# Patient Record
Sex: FEMALE | Race: WHITE | URBAN - METROPOLITAN AREA
[De-identification: names, ages, dates, MRNs, and addresses within clinical notes are randomized per-mention and may not be internally consistent; named-entity substitution may affect disease eponyms.]

---

## 2017-01-18 ENCOUNTER — TELEPHONE (OUTPATIENT)
Dept: FAMILY MEDICINE | Facility: OTHER | Age: 32
End: 2017-01-18

## 2017-01-18 DIAGNOSIS — Z30.49 ENCOUNTER FOR SURVEILLANCE OF OTHER CONTRACEPTIVES: Primary | ICD-10-CM

## 2017-01-18 NOTE — TELEPHONE ENCOUNTER
NuvaRing      Last Written Prescription Date: 1/22/2016  Last Fill Quantity: 1each,  # refills: 12 ordered   Last Office Visit with G, P or [facility] prescribing provider: 1/22/2016    Rema Healy MA

## 2017-01-19 RX ORDER — ETONOGESTREL/ETHINYL ESTRADIOL .12-.015MG
RING, VAGINAL VAGINAL
Qty: 1 EACH | Refills: 0 | Status: SHIPPED | OUTPATIENT
Start: 2017-01-19 | End: 2017-02-23

## 2017-01-19 NOTE — TELEPHONE ENCOUNTER
Medication is being filled for 1 time refill only due to:  Patient needs to be seen because it has been more than one year since last visit.     Mone Palacios, RN, BSN

## 2017-02-21 NOTE — PROGRESS NOTES
SUBJECTIVE:     CC: Tana Figueroa is an 31 year old woman who presents for preventive health visit.     Physical   Annual:     Getting at least 3 servings of Calcium per day::  Yes    Bi-annual eye exam::  NO    Dental care twice a year::  Yes    Sleep apnea or symptoms of sleep apnea::  None    Diet::  Regular (no restrictions)    Frequency of exercise::  2-3 days/week    Duration of exercise::  30-45 minutes    Taking medications regularly::  Yes    Medication side effects::  None    Additional concerns today::  No        -------------------------------------    Today's PHQ-2 Score:   PHQ-2 ( 1999 [employer]) 2/20/2017   Q1: Little interest or pleasure in doing things -   Q2: Feeling down, depressed or hopeless -   PHQ-2 Score -   Little interest or pleasure in doing things Not at all   Feeling down, depressed or hopeless Not at all   PHQ-2 Score 0       Abuse: Current or Past(Physical, Sexual or Emotional)- No  Do you feel safe in your environment - Yes    Social History   Substance Use Topics     Smoking status: Never Smoker     Smokeless tobacco: Never Used     Alcohol use Yes      Comment: occ     The patient does not drink >3 drinks per day nor >7 drinks per week.    No results for input(s): CHOL, HDL, LDL, TRIG, CHOLHDLRATIO, NHDL in the last 30213 hours.    Reviewed orders with patient.  Reviewed health maintenance and updated orders accordingly - Yes    Mammo Decision Support:  Mammogram not appropriate for this patient based on age.    Pertinent mammograms are reviewed under the imaging tab.  History of abnormal Pap smear: NO - age 30- 65 PAP every 3 years recommended  All Histories reviewed and updated in Epic.    Past Medical History   Diagnosis Date     Migraine       History reviewed. No pertinent past surgical history.    ROS:  C: NEGATIVE for fever, chills, change in weight  I: NEGATIVE for worrisome rashes, moles or lesions  E: NEGATIVE for vision changes or irritation  ENT: NEGATIVE for ear, mouth  and throat problems  R: NEGATIVE for significant cough or SOB  B: NEGATIVE for masses, tenderness or discharge  CV: NEGATIVE for chest pain, palpitations or peripheral edema  GI: NEGATIVE for nausea, abdominal pain, heartburn, or change in bowel habits  : NEGATIVE for unusual urinary or vaginal symptoms. Periods are regular.  M: NEGATIVE for significant arthralgias or myalgia  N: NEGATIVE for weakness, dizziness or paresthesias  P: NEGATIVE for changes in mood or affect    Problem list, Medication list, Allergies, and Medical/Social/Surgical histories reviewed in [facility] and updated as appropriate.  Labs reviewed in EPIC  BP Readings from Last 3 Encounters:   02/23/17 (!) 142/104   01/22/16 128/80   11/04/15 (!) 154/107    Wt Readings from Last 3 Encounters:   No data found for Wt                  Patient Active Problem List   Diagnosis     Migraine without aura     Fatigue     Screening for lipoid disorders     Contraception     Elevated blood pressure reading without diagnosis of hypertension     Anxiety     History reviewed. No pertinent past surgical history.    Social History   Substance Use Topics     Smoking status: Never Smoker     Smokeless tobacco: Never Used     Alcohol use Yes      Comment: occ     Family History   Problem Relation Age of Onset     Hypertension Mother      DIABETES Mother      DIABETES Father      C.A.D. Maternal Grandmother          Current Outpatient Prescriptions   Medication Sig Dispense Refill     hydrOXYzine (ATARAX) 25 MG tablet Take 1-2 tablets (25-50 mg) by mouth every 6 hours as needed for anxiety 60 tablet 1     etonogestrel-ethinyl estradiol (NUVARING) 0.12-0.015 MG/24HR vaginal ring Place 1 each vaginally every 30 days 1 each 11     [DISCONTINUED] NUVARING 0.12-0.015 MG/24HR vaginal ring INSERT 1 VAGINAL RING BY VAGINAL ROUTE ONCE A MONTH LEAVE IN PLACE FOR 3 WEEKS, REMOVE FOR 1 WEEK 1 each 0     No Known Allergies  OBJECTIVE:     BP (!) 142/104  Pulse 113  Temp 98.2  F (36.8   C) (Temporal)  Resp 12  SpO2 96%   BP (!) 142/104  Pulse 113  Temp 98.2  F (36.8  C) (Temporal)  Resp 12  SpO2 96%    EXAM:  Physical Exam   Constitutional: She is oriented to person, place, and time. She appears well-developed and well-nourished.   HENT:   Head: Normocephalic and atraumatic.   Right Ear: External ear normal.   Left Ear: External ear normal.   Mouth/Throat: Oropharynx is clear and moist.   Eyes: EOM are normal.   Neck: Neck supple.   Cardiovascular: Normal rate and regular rhythm.    Pulmonary/Chest: Effort normal and breath sounds normal.   Abdominal: Soft. Bowel sounds are normal.   Musculoskeletal: Normal range of motion.   Neurological: She is alert and oriented to person, place, and time.   Psychiatric:   Anxious          ASSESSMENT/PLAN:       Problem List Items Addressed This Visit        SPECIALTY PROBLEM LIST    Contraception     On nuvaring   No side effects  Refills provided            Other    Migraine without aura     She does not wish to be on medications.  counselled         Elevated blood pressure reading without diagnosis of hypertension     Due to anxiety   Will monitor         Anxiety     Declines treatment for anxiety   Trial hydroxyzine for anxiety attacks  She seems to be obese and would benefit from a sleep study but she declined this today          Relevant Medications    hydrOXYzine (ATARAX) 25 MG tablet    Other Relevant Orders    TSH with free T4 reflex (Completed)    Comprehensive metabolic panel (BMP + Alb, Alk Phos, ALT, AST, Total. Bili, TP) (Completed)    CBC with platelets (Completed)      Other Visit Diagnoses     Encounter for routine adult health examination with abnormal findings    -  Primary    Need for prophylactic vaccination and inoculation against influenza        Encounter for surveillance of other contraceptives        Relevant Medications    etonogestrel-ethinyl estradiol (NUVARING) 0.12-0.015 MG/24HR vaginal ring    Other Relevant Orders    Lipid panel  reflex to direct LDL (Completed)        Needs letter for tinted glasses due to her migraine  Declined to be weighed today   Is extremly anxious which could be the reason for elevated blood pressure    COUNSELING:  Reviewed preventive health counseling, as reflected in patient instructions       Regular exercise       Healthy diet/nutrition       Vision screening       Hearing screening    BP Screening:   Last 3 BP Readings:    BP Readings from Last 3 Encounters:   02/23/17 (!) 142/104   01/22/16 128/80   11/04/15 (!) 154/107       The following was recommended to the patient:  Recommend lifestyle modifications     reports that she has never smoked. She has never used smokeless tobacco.    There is no height or weight on file to calculate BMI.   Weight management plan: Unknown weight as pt refuses to be weighed    Counseling Resources:  ATP IV Guidelines  Pooled Cohorts Equation Calculator  Breast Cancer Risk Calculator  FRAX Risk Assessment  ICSI Preventive Guidelines  Dietary Guidelines for Americans, 2010  USDA's MyPlate  ASA Prophylaxis  Lung CA Screening    Crystal Pelletier MD  [facility]  Answers for HPI/ROS submitted by the patient on 2/20/2017   PHQ-2 Depressed: Not at all, Not at all  PHQ-2 Score: 0

## 2017-02-23 ENCOUNTER — OFFICE VISIT (OUTPATIENT)
Dept: FAMILY MEDICINE | Facility: OTHER | Age: 32
End: 2017-02-23
Payer: COMMERCIAL

## 2017-02-23 VITALS
OXYGEN SATURATION: 96 % | TEMPERATURE: 98.2 F | SYSTOLIC BLOOD PRESSURE: 142 MMHG | RESPIRATION RATE: 12 BRPM | DIASTOLIC BLOOD PRESSURE: 104 MMHG | HEART RATE: 113 BPM

## 2017-02-23 DIAGNOSIS — Z30.9 ENCOUNTER FOR CONTRACEPTIVE MANAGEMENT, UNSPECIFIED CONTRACEPTIVE ENCOUNTER TYPE: ICD-10-CM

## 2017-02-23 DIAGNOSIS — R73.9 HYPERGLYCEMIA: Primary | ICD-10-CM

## 2017-02-23 DIAGNOSIS — Z30.49 ENCOUNTER FOR SURVEILLANCE OF OTHER CONTRACEPTIVES: ICD-10-CM

## 2017-02-23 DIAGNOSIS — F41.9 ANXIETY: ICD-10-CM

## 2017-02-23 DIAGNOSIS — G43.009 MIGRAINE WITHOUT AURA AND WITHOUT STATUS MIGRAINOSUS, NOT INTRACTABLE: ICD-10-CM

## 2017-02-23 DIAGNOSIS — Z23 NEED FOR PROPHYLACTIC VACCINATION AND INOCULATION AGAINST INFLUENZA: ICD-10-CM

## 2017-02-23 DIAGNOSIS — R03.0 ELEVATED BLOOD PRESSURE READING WITHOUT DIAGNOSIS OF HYPERTENSION: ICD-10-CM

## 2017-02-23 DIAGNOSIS — Z00.01 ENCOUNTER FOR ROUTINE ADULT HEALTH EXAMINATION WITH ABNORMAL FINDINGS: Primary | ICD-10-CM

## 2017-02-23 LAB
ALBUMIN SERPL-MCNC: 3.6 G/DL (ref 3.4–5)
ALP SERPL-CCNC: 47 U/L (ref 40–150)
ALT SERPL W P-5'-P-CCNC: 19 U/L (ref 0–50)
ANION GAP SERPL CALCULATED.3IONS-SCNC: 10 MMOL/L (ref 3–14)
AST SERPL W P-5'-P-CCNC: 13 U/L (ref 0–45)
BILIRUB SERPL-MCNC: 0.3 MG/DL (ref 0.2–1.3)
BUN SERPL-MCNC: 12 MG/DL (ref 7–30)
CALCIUM SERPL-MCNC: 8.7 MG/DL (ref 8.5–10.1)
CHLORIDE SERPL-SCNC: 106 MMOL/L (ref 94–109)
CHOLEST SERPL-MCNC: 170 MG/DL
CO2 SERPL-SCNC: 26 MMOL/L (ref 20–32)
CREAT SERPL-MCNC: 0.58 MG/DL (ref 0.52–1.04)
ERYTHROCYTE [DISTWIDTH] IN BLOOD BY AUTOMATED COUNT: 14.9 % (ref 10–15)
GFR SERPL CREATININE-BSD FRML MDRD: ABNORMAL ML/MIN/1.7M2
GLUCOSE SERPL-MCNC: 140 MG/DL (ref 70–99)
HBA1C MFR BLD: 6 % (ref 4.3–6)
HCT VFR BLD AUTO: 40.2 % (ref 35–47)
HDLC SERPL-MCNC: 40 MG/DL
HGB BLD-MCNC: 13.2 G/DL (ref 11.7–15.7)
LDLC SERPL CALC-MCNC: 104 MG/DL
MCH RBC QN AUTO: 28.1 PG (ref 26.5–33)
MCHC RBC AUTO-ENTMCNC: 32.8 G/DL (ref 31.5–36.5)
MCV RBC AUTO: 86 FL (ref 78–100)
NONHDLC SERPL-MCNC: 130 MG/DL
PLATELET # BLD AUTO: 316 10E9/L (ref 150–450)
POTASSIUM SERPL-SCNC: 3.7 MMOL/L (ref 3.4–5.3)
PROT SERPL-MCNC: 7.8 G/DL (ref 6.8–8.8)
RBC # BLD AUTO: 4.7 10E12/L (ref 3.8–5.2)
SODIUM SERPL-SCNC: 142 MMOL/L (ref 133–144)
TRIGL SERPL-MCNC: 129 MG/DL
TSH SERPL DL<=0.005 MIU/L-ACNC: 1.44 MU/L (ref 0.4–4)
WBC # BLD AUTO: 8.7 10E9/L (ref 4–11)

## 2017-02-23 PROCEDURE — 83036 HEMOGLOBIN GLYCOSYLATED A1C: CPT | Performed by: FAMILY MEDICINE

## 2017-02-23 PROCEDURE — 36415 COLL VENOUS BLD VENIPUNCTURE: CPT | Performed by: FAMILY MEDICINE

## 2017-02-23 PROCEDURE — 85027 COMPLETE CBC AUTOMATED: CPT | Performed by: FAMILY MEDICINE

## 2017-02-23 PROCEDURE — 99395 PREV VISIT EST AGE 18-39: CPT | Performed by: FAMILY MEDICINE

## 2017-02-23 PROCEDURE — 84443 ASSAY THYROID STIM HORMONE: CPT | Performed by: FAMILY MEDICINE

## 2017-02-23 PROCEDURE — 80053 COMPREHEN METABOLIC PANEL: CPT | Performed by: FAMILY MEDICINE

## 2017-02-23 PROCEDURE — 80061 LIPID PANEL: CPT | Performed by: FAMILY MEDICINE

## 2017-02-23 RX ORDER — HYDROXYZINE HYDROCHLORIDE 25 MG/1
25-50 TABLET, FILM COATED ORAL EVERY 6 HOURS PRN
Qty: 60 TABLET | Refills: 1 | Status: SHIPPED | OUTPATIENT
Start: 2017-02-23 | End: 2017-10-25

## 2017-02-23 RX ORDER — ETONOGESTREL AND ETHINYL ESTRADIOL VAGINAL RING .015; .12 MG/D; MG/D
1 RING VAGINAL
Qty: 1 EACH | Refills: 11 | Status: SHIPPED | OUTPATIENT
Start: 2017-02-23 | End: 2018-01-25

## 2017-02-23 ASSESSMENT — PAIN SCALES - GENERAL: PAINLEVEL: SEVERE PAIN (6)

## 2017-02-23 NOTE — LETTER
[address]  [facility] [address]  [phone]  Dept: [phone]      2/23/2017    Re: Tana Figueroa      TO WHOM IT MAY CONCERN:    This Letter is to authorize patient's tinted windows as a medical necessity for her diagnosis of Migraine headache.      The minimum percentage of light transmittance may be reduced to 15% in order to help reduce the frequency of migraine headaches.     This letter of medical necessity is valid for 1 year from the date of this letter at which point she needs to be reevaluated for her headaches     Cordially     Crystal Pelletier MD  [facility]

## 2017-02-23 NOTE — ASSESSMENT & PLAN NOTE
Declines treatment for anxiety   Trial hydroxyzine for anxiety attacks  She seems to be obese and would benefit from a sleep study but she declined this today

## 2017-02-23 NOTE — NURSING NOTE
Chief Complaint   Patient presents with     Physical     Panel Management     height, april, flu, map       Initial BP (!) 170/96  Pulse 113  Temp 98.2  F (36.8  C) (Temporal)  Resp 12  SpO2 96% There is no height or weight on file to calculate BMI.  Medication Reconciliation: complete  Velma Yuan CMA (AAMA)

## 2017-02-23 NOTE — LETTER
My Migraine Action Plan      Date: 2/23/2017     My Name: Tana Figueroa   YOB: 1985  My Pharmacy: [facility] - [place], [place] - [address] [facility] [facility] [place]       My (Preventative) Control Medicine:None        My Rescue Medicine: None   My Doctor: No primary care provider on file.     My Clinic: [facility]  [address]  [facility] [address]  [phone]        GREEN ZONE = Good Control    My headache plan is working.   I can do what I need to do.           I WILL:     ? Keep managing my triggers.  ? Write in my migraine diary each time I have a headache.  ? Keep taking my preventive (controller) medicine daily.  ? Take my relief and rescue medicine as needed.             YELLOW ZONE = Not Enough Control    My headache plan isn t always working.   My headaches keep me from doing   some of the things I need to do.       I WILL:     ? Set goals to control my triggers and act on them.  ? Write in my migraine diary each time I have a headache and review it for                      patterns or new triggers.  ? Keep taking my preventive (controller) medicine daily.  ? Take my relief and rescue medicine as needed.  ? Call my doctor or clinic at if I stay in the Yellow Zone.             RED ZONE = Poor or No Control    My headache plan has  failed. I can t do anything  when I have one. My  medicines aren t working.           I WILL:   ? Set goals to control my triggers and act on them.  ? Write in my migraine diary each time I have a headache and review it for                      patterns or new triggers.  ? Keep taking my preventive (controller) medicine daily.  ? Take my relief and rescue medicine as needed.  ? Call my doctor or clinic or go to urgent care or an ER if I m having the worst                  headache of my life.  ? Call my doctor or clinic or go to urgent care or an ER if my medicine doesn t work.  ? Let my doctor or clinic know within 2 weeks if I have gone to  an urgent care or             emergency department.          Provider specific instructions:

## 2017-02-23 NOTE — MR AVS SNAPSHOT
After Visit Summary   2/23/2017    Tana Figueroa    MRN: [id number]           Patient Information     Date Of Birth          1985        Visit Information        Provider Department      2/23/2017 11:00 AM Crystal Pelletier MD [facility]        Today's Diagnoses     Need for prophylactic vaccination and inoculation against influenza    -  1    Anxiety        Encounter for surveillance of other contraceptives           Follow-ups after your visit        Follow-up notes from your care team     Return in about 1 year (around 2/23/2018) for Physical Exam.      Future tests that were ordered for you today     Open Future Orders        Priority Expected Expires Ordered    TSH with free T4 reflex Routine  2/23/2018 2/23/2017    Lipid panel reflex to direct LDL Routine  2/23/2018 2/23/2017    Comprehensive metabolic panel (BMP + Alb, Alk Phos, ALT, AST, Total. Bili, TP) Routine  2/23/2018 2/23/2017    CBC with platelets Routine  2/23/2018 2/23/2017            Who to contact     If you have questions or need follow up information about today's clinic visit or your schedule please contact [facility] directly at [phone].  Normal or non-critical lab and imaging results will be communicated to you by [employer], letter or phone within 4 business days after the clinic has received the results. If you do not hear from us within 7 days, please contact the clinic through [employer] or phone. If you have a critical or abnormal lab result, we will notify you by phone as soon as possible.  Submit refill requests through [employer] ([employer]) or call your pharmacy and they will forward the refill request to us. Please allow 3 business days for your refill to be completed.          Additional Information About Your Visit        MyChart Information     [employer] gives you secure access to your electronic health record. If you see a primary care provider, you can also send messages to your care team and  make appointments. If you have questions, please call your primary care clinic.  If you do not have a primary care provider, please call [phone] and they will assist you.        Care EveryWhere ID     This is your Care EveryWhere ID. This could be used by other organizations to access your [facility] medical records  [id number]        Your Vitals Were     Pulse Temperature Respirations Pulse Oximetry          113 98.2  F (36.8  C) (Temporal) 12 96%         Blood Pressure from Last 3 Encounters:   02/23/17 (!) 170/96   01/22/16 128/80   11/04/15 (!) 154/107    Weight from Last 3 Encounters:   No data found for Wt              We Performed the Following     MIGRAINE ACTION PLAN          Today's Medication Changes          These changes are accurate as of: 2/23/17 11:48 AM.  If you have any questions, ask your nurse or doctor.               Start taking these medicines.        Dose/Directions    hydrOXYzine 25 MG tablet   Commonly known as:  ATARAX   Used for:  Anxiety   Started by:  Crystal Pelletier MD        Dose:  25-50 mg   Take 1-2 tablets (25-50 mg) by mouth every 6 hours as needed for anxiety   Quantity:  60 tablet   Refills:  1         These medicines have changed or have updated prescriptions.        Dose/Directions    etonogestrel-ethinyl estradiol 0.12-0.015 MG/24HR vaginal ring   Commonly known as:  NUVARING   This may have changed:  See the new instructions.   Used for:  Encounter for surveillance of other contraceptives   Changed by:  Crystal Pelletier MD        Dose:  1 each   Place 1 each vaginally every 30 days   Quantity:  1 each   Refills:  11            Where to get your medicines      These medications were sent to [facility] [address] [place] [address] [address] [address]     Phone:  [phone]     etonogestrel-ethinyl estradiol 0.12-0.015 MG/24HR vaginal ring    hydrOXYzine 25 MG tablet                Primary Care Provider    None Specified        No primary provider on file.        Thank you!     Thank you for choosing [facility]  for your care. Our goal is always to provide you with excellent care. Hearing back from our patients is one way we can continue to improve our services. Please take a few minutes to complete the written survey that you may receive in the mail after your visit with us. Thank you!             Your Updated Medication List - Protect others around you: Learn how to safely use, store and throw away your medicines at www.disposemymeds.org.          This list is accurate as of: 2/23/17 11:48 AM.  Always use your most recent med list.                   Brand Name Dispense Instructions for use    etonogestrel-ethinyl estradiol 0.12-0.015 MG/24HR vaginal ring    NUVARING    1 each    Place 1 each vaginally every 30 days       hydrOXYzine 25 MG tablet    ATARAX    60 tablet    Take 1-2 tablets (25-50 mg) by mouth every 6 hours as needed for anxiety

## 2017-03-01 ENCOUNTER — MYC MEDICAL ADVICE (OUTPATIENT)
Dept: FAMILY MEDICINE | Facility: OTHER | Age: 32
End: 2017-03-01

## 2017-03-02 ENCOUNTER — MYC MEDICAL ADVICE (OUTPATIENT)
Dept: FAMILY MEDICINE | Facility: OTHER | Age: 32
End: 2017-03-02

## 2017-10-25 ENCOUNTER — OFFICE VISIT (OUTPATIENT)
Dept: URGENT CARE | Facility: RETAIL CLINIC | Age: 32
End: 2017-10-25
Payer: COMMERCIAL

## 2017-10-25 VITALS
DIASTOLIC BLOOD PRESSURE: 112 MMHG | SYSTOLIC BLOOD PRESSURE: 164 MMHG | OXYGEN SATURATION: 100 % | TEMPERATURE: 99.2 F | HEART RATE: 121 BPM

## 2017-10-25 DIAGNOSIS — J34.3 NASAL TURBINATE HYPERTROPHY: Primary | ICD-10-CM

## 2017-10-25 DIAGNOSIS — R03.0 ELEVATED BLOOD PRESSURE READING WITHOUT DIAGNOSIS OF HYPERTENSION: ICD-10-CM

## 2017-10-25 PROCEDURE — 99213 OFFICE O/P EST LOW 20 MIN: CPT | Performed by: PHYSICIAN ASSISTANT

## 2017-10-25 RX ORDER — FLUTICASONE PROPIONATE 50 MCG
2 SPRAY, SUSPENSION (ML) NASAL DAILY
Qty: 1 BOTTLE | Refills: 0 | Status: SHIPPED | OUTPATIENT
Start: 2017-10-25

## 2017-10-25 NOTE — MR AVS SNAPSHOT
After Visit Summary   10/25/2017    Tana Figueroa    MRN: [id number]           Patient Information     Date Of Birth          1985        Visit Information        Provider Department      10/25/2017 7:30 PM Myra Clemente PA-C [facility]        Today's Diagnoses     Nasal turbinate hypertrophy    -  1      Care Instructions    Use steroid nasal spray 2 sprays in each nostril daily until symptoms clear  Take decongestant/sudafed daily   Please follow up with primary care provider if not improving, worsening or new symptoms          Follow-ups after your visit        Who to contact     You can reach your care team any time of the day by calling [phone].  Notification of test results:  If you have an abnormal lab result, we will notify you by phone as soon as possible.         Additional Information About Your Visit        MyChart Information     [employer] gives you secure access to your electronic health record. If you see a primary care provider, you can also send messages to your care team and make appointments. If you have questions, please call your primary care clinic.  If you do not have a primary care provider, please call [phone] and they will assist you.        Care EveryWhere ID     This is your Care EveryWhere ID. This could be used by other organizations to access your [facility] medical records  [id number]        Your Vitals Were     Pulse Temperature Pulse Oximetry             121 99.2  F (37.3  C) (Tympanic) 100%          Blood Pressure from Last 3 Encounters:   10/25/17 (!) 164/112   02/23/17 (!) 142/104   01/22/16 128/80    Weight from Last 3 Encounters:   No data found for Wt              Today, you had the following     No orders found for display         Today's Medication Changes          These changes are accurate as of: 10/25/17  8:01 PM.  If you have any questions, ask your nurse or doctor.               Start taking these medicines.         Dose/Directions    fluticasone 50 MCG/ACT spray   Commonly known as:  FLONASE   Used for:  Nasal turbinate hypertrophy   Started by:  Myra Clemente PA-C        Dose:  2 spray   Spray 2 sprays into both nostrils daily   Quantity:  1 Bottle   Refills:  0            Where to get your medicines      These medications were sent to [facility] [address] [facility] [address] [facility]  [address] [facility] [address]     Phone:  [phone]     fluticasone 50 MCG/ACT spray                Primary Care Provider    None Specified       No primary provider on file.        Equal Access to Services     [facility]: Hadii dax kohler hadsherine Soshady, waaxda luqadaha, qaybta kaalmada ava, ricky garcia . So [facility] [phone].    ATENCIÓN: Si habla español, tiene a adams disposición servicios gratuitos de asistencia lingüística. [facility] [phone].    We comply with applicable federal civil rights laws and [place] laws. We do not discriminate on the basis of race, color, national origin, age, disability, sex, sexual orientation, or gender identity.            Thank you!     Thank you for choosing [facility]  for your care. Our goal is always to provide you with excellent care. Hearing back from our patients is one way we can continue to improve our services. Please take a few minutes to complete the written survey that you may receive in the mail after your visit with us. Thank you!             Your Updated Medication List - Protect others around you: Learn how to safely use, store and throw away your medicines at www.disposemymeds.org.          This list is accurate as of: 10/25/17  8:01 PM.  Always use your most recent med list.                   Brand Name Dispense Instructions for use Diagnosis    etonogestrel-ethinyl estradiol 0.12-0.015 MG/24HR vaginal ring    NUVARING    1 each    Place 1 each vaginally every 30 days    Encounter for  surveillance of other contraceptives       fluticasone 50 MCG/ACT spray    FLONASE    1 Bottle    Spray 2 sprays into both nostrils daily    Nasal turbinate hypertrophy

## 2017-10-26 NOTE — PATIENT INSTRUCTIONS
Use steroid nasal spray 2 sprays in each nostril daily until symptoms clear  Take decongestant/sudafed daily   Please follow up with primary care provider if not improving, worsening or new symptoms

## 2017-10-26 NOTE — NURSING NOTE
Chief Complaint   Patient presents with     Sinus Problem     started Friday or Saturday. pressure in face     Headache       Initial BP (!) 164/112 (Cuff Size: Adult Regular)  Pulse 121  Temp 99.2  F (37.3  C) (Tympanic)  SpO2 100% There is no height or weight on file to calculate BMI.  Medication Reconciliation: complete   Barbara Brandt CMA (AAMA)

## 2017-10-26 NOTE — PROGRESS NOTES
Chief Complaint   Patient presents with     Sinus Problem     started Friday or Saturday. pressure in face     Headache     SUBJECTIVE:   Tana Figueroa is a 32 year old female presenting with a chief complaint of headache for 5 days. States it is in center between eyebrows and also under cheeks. She is also having migraine headache symptoms which are typical for her. She has had ongoing allergy symptoms of sneezing, drainage but states allergy medications don't help/work so she doesn't take anything  Onset of symptoms was 4 day(s) ago.  Course of illness is worsening.    Severity moderate  Current and Associated symptoms:headache, photophobia, pressure in cheeks, between eyebrows,   Treatment measures tried include None tried.  Predisposing factors include history of migraines    Past Medical History:   Diagnosis Date     Migraine      Current Outpatient Prescriptions   Medication Sig Dispense Refill     fluticasone (FLONASE) 50 MCG/ACT spray Spray 2 sprays into both nostrils daily 1 Bottle 0     etonogestrel-ethinyl estradiol (NUVARING) 0.12-0.015 MG/24HR vaginal ring Place 1 each vaginally every 30 days 1 each 11      No Known Allergies     Social History   Substance Use Topics     Smoking status: Never Smoker     Smokeless tobacco: Never Used     Alcohol use Yes      Comment: occ     ROS:  CONSTITUTIONAL:NEGATIVE for fever, chills  ENT/MOUTH: POSITIVE for nasal congestion, rhinorrhea-clear and sinus pressure and NEGATIVE for ear pain bilateral and sore throat  RESP:NEGATIVE for significant cough or wheezing    OBJECTIVE:  BP (!) 164/112 (Cuff Size: Adult Regular)  Pulse 121  Temp 99.2  F (37.3  C) (Tympanic)  SpO2 100%  GENERAL APPEARANCE: mild distress, bothered by overhead lights, put on sunglasses during discussion  EYES: conjunctiva clear  HENT: multiple pressure points along skull tender with pressure. ear canals and TM's normal.  Nose boggy, swollen nasal turbinates, no discharge. mouth without ulcers,  erythema or lesions  NECK: supple, nontender, no lymphadenopathy  RESP: lungs clear to auscultation - no rales, rhonchi or wheezes  CV: regular rates and rhythm, normal S1 S2, no murmur noted  SKIN: no suspicious lesions or rashes    ASSESSMENT:  (J34.3) Nasal turbinate hypertrophy  (primary encounter diagnosis)  (R03.0) Elevated blood pressure reading without diagnosis of hypertension  Headache    PLAN:  fluticasone (FLONASE) 50 MCG/ACT spray  Use steroid nasal spray 2 sprays in each nostril daily until symptoms clear  Please follow up with primary care provider if not improving, worsening or new symptoms    Recheck blood pressure. States at home BP is typically 120s/70s or 80s    Myra Clemente PA-C  [facility]

## 2018-01-25 ENCOUNTER — MYC MEDICAL ADVICE (OUTPATIENT)
Dept: FAMILY MEDICINE | Facility: OTHER | Age: 33
End: 2018-01-25

## 2018-01-25 DIAGNOSIS — Z30.49 ENCOUNTER FOR SURVEILLANCE OF OTHER CONTRACEPTIVES: ICD-10-CM

## 2018-01-25 RX ORDER — ETONOGESTREL AND ETHINYL ESTRADIOL VAGINAL RING .015; .12 MG/D; MG/D
1 RING VAGINAL
Qty: 1 EACH | Refills: 3 | Status: SHIPPED | OUTPATIENT
Start: 2018-01-25 | End: 2018-05-15

## 2018-01-25 RX ORDER — ETONOGESTREL/ETHINYL ESTRADIOL .12-.015MG
RING, VAGINAL VAGINAL
Qty: 1 EACH | Refills: 10 | Status: CANCELLED | OUTPATIENT
Start: 2018-01-25

## 2018-01-26 NOTE — TELEPHONE ENCOUNTER
etonogestrel-ethinyl estradiol (NUVARING) 0.12-0.015 MG/24HR vaginal ring  Rx was sent 01/25/2018 for 1 each and 3 refills. Pharmacy notified via E-prescribe refusal.  Carri Hogan RN, BSN

## 2018-01-26 NOTE — TELEPHONE ENCOUNTER
Pt returned call for follow up on message below for refill . Please contact pt at [phone] or [employer] message pt.

## 2018-01-26 NOTE — TELEPHONE ENCOUNTER
Pt called back. States to disregards last message, the pharmacy had it.  Thank you,  Ivy Rome- Pt Rep.

## 2018-01-27 DIAGNOSIS — Z30.49 ENCOUNTER FOR SURVEILLANCE OF OTHER CONTRACEPTIVES: ICD-10-CM

## 2018-01-30 RX ORDER — ETONOGESTREL/ETHINYL ESTRADIOL .12-.015MG
RING, VAGINAL VAGINAL
Qty: 1 EACH | Refills: 10 | OUTPATIENT
Start: 2018-01-30

## 2018-01-30 NOTE — TELEPHONE ENCOUNTER
Nuvaring    Sent 1/25/2018 with 4 month supply. Refill not appropriate at this time.       Kami Cleaning, RN, BSN

## 2018-02-23 ENCOUNTER — TELEPHONE (OUTPATIENT)
Dept: FAMILY MEDICINE | Facility: OTHER | Age: 33
End: 2018-02-23

## 2018-02-23 NOTE — TELEPHONE ENCOUNTER
"Patient called and said that she has received a call today at about 12:30 regarding her Nuvaring. There is no documentation as to why she was called and who called her. I notified patient that unfortunately since we are unsure of who called her, we are unsure of why she was called. I asked patient if there was anything I could do to assist her and she said that she spoke with the makers of Nuvaring earlier today. Pt does not have insurance so she called them to see if there was any sort of discount she could get and the makers of Nuvaring advised pt to call her clinic to see if she could get a \"sample\" instead.  And if we have them or we can get them. I informed pt that unfortunately we do not have samples of nuvarings. I advised pt to contact a [facility] or a private practice clinic to see if they possibly would. Pt had not further questions at this time.    Crystal Beaver, [facility]        "

## 2018-05-14 ENCOUNTER — MYC MEDICAL ADVICE (OUTPATIENT)
Dept: FAMILY MEDICINE | Facility: OTHER | Age: 33
End: 2018-05-14

## 2018-05-14 DIAGNOSIS — Z30.49 ENCOUNTER FOR SURVEILLANCE OF OTHER CONTRACEPTIVES: ICD-10-CM

## 2018-05-15 RX ORDER — ETONOGESTREL AND ETHINYL ESTRADIOL VAGINAL RING .015; .12 MG/D; MG/D
1 RING VAGINAL
Qty: 1 EACH | Refills: 3 | Status: SHIPPED | OUTPATIENT
Start: 2018-05-15 | End: 2018-09-05

## 2018-05-15 NOTE — TELEPHONE ENCOUNTER
Nuvaring    Routing refill request to provider for review/approval because:  Patient needs to be seen because it has been more than 1 year since last office visit.    Last OV 2/23/2017    Note from Pt:  Kenneth Pelletier!     I have been trying to get medical insurance. Unfortunately, I am unable to get any, until open enrollment.   So I will not have  insurance until Nov, 01 2018.   And I cannot afford office visits. As my birth control costs $173.73 per month.   And I am having a hard enough time, just covering that.  I do not want to switch birth control, or have a laps in taking it either.  Do to my migraines. I try not to change anything.   I am out of refills, and will need a Nuvaring, by Sunday 05/20.   Could you please, refill my Nuvaring, a few more months.   Just until I have insurance coverage. Then I will be able to come in for a visit.   Thank you!    Tana Cleaning, RN, BSN

## 2018-09-05 ENCOUNTER — MYC MEDICAL ADVICE (OUTPATIENT)
Dept: FAMILY MEDICINE | Facility: OTHER | Age: 33
End: 2018-09-05

## 2018-09-05 DIAGNOSIS — Z30.49 ENCOUNTER FOR SURVEILLANCE OF OTHER CONTRACEPTIVES: ICD-10-CM

## 2018-09-05 NOTE — TELEPHONE ENCOUNTER
[employer] message sent.    Heaven Chaidez, [facility]  September 5, 2018

## 2018-09-06 RX ORDER — ETONOGESTREL AND ETHINYL ESTRADIOL VAGINAL RING .015; .12 MG/D; MG/D
1 RING VAGINAL
Qty: 1 EACH | Refills: 3 | Status: SHIPPED | OUTPATIENT
Start: 2018-09-06 | End: 2018-09-07

## 2018-09-06 NOTE — TELEPHONE ENCOUNTER
Nuvaring  Routing refill request to provider for review/approval because:  Patient needs to be seen because it has been more than 1 year since last office visit.  Patient states she will come in to clinic in December when she has insurance.     Mone Palacios, RN, BSN

## 2018-09-07 DIAGNOSIS — Z30.49 ENCOUNTER FOR SURVEILLANCE OF OTHER CONTRACEPTIVES: ICD-10-CM

## 2018-09-07 RX ORDER — ETONOGESTREL AND ETHINYL ESTRADIOL VAGINAL RING .015; .12 MG/D; MG/D
RING VAGINAL
Qty: 1 EACH | Refills: 3 | Status: SHIPPED | OUTPATIENT
Start: 2018-09-07 | End: 2018-12-20

## 2018-09-07 NOTE — TELEPHONE ENCOUNTER
"[facility] calling - they state the sig for the Nuvaring is wrong. Nuvaring sent yesterday with sig: \"Place 1 each vaginally every 30 days - Vaginal\"    He states it should say something like \"Insert vaginally for 3 weeks and remove for 1 week and repeat.\"    Please review/advise.  Beata Maxwell CMA    "

## 2018-12-20 ENCOUNTER — MYC MEDICAL ADVICE (OUTPATIENT)
Dept: FAMILY MEDICINE | Facility: OTHER | Age: 33
End: 2018-12-20

## 2018-12-20 DIAGNOSIS — Z30.49 ENCOUNTER FOR SURVEILLANCE OF OTHER CONTRACEPTIVES: ICD-10-CM

## 2018-12-20 RX ORDER — ETONOGESTREL AND ETHINYL ESTRADIOL VAGINAL RING .015; .12 MG/D; MG/D
RING VAGINAL
Qty: 1 EACH | Refills: 1 | Status: SHIPPED | OUTPATIENT
Start: 2018-12-20 | End: 2019-01-25

## 2018-12-20 RX ORDER — ETONOGESTREL AND ETHINYL ESTRADIOL VAGINAL RING .015; .12 MG/D; MG/D
RING VAGINAL
Qty: 1 EACH | Refills: 1 | Status: CANCELLED | OUTPATIENT
Start: 2018-12-20

## 2018-12-20 NOTE — TELEPHONE ENCOUNTER
Prescription approved per [facility] Refill Protocol.  Reyes Cortes, RN, BSN

## 2019-01-25 ENCOUNTER — MYC MEDICAL ADVICE (OUTPATIENT)
Dept: FAMILY MEDICINE | Facility: OTHER | Age: 34
End: 2019-01-25

## 2019-01-25 DIAGNOSIS — Z30.49 ENCOUNTER FOR SURVEILLANCE OF OTHER CONTRACEPTIVES: ICD-10-CM

## 2019-01-25 RX ORDER — ETONOGESTREL AND ETHINYL ESTRADIOL VAGINAL RING .015; .12 MG/D; MG/D
RING VAGINAL
Qty: 1 EACH | Refills: 0 | Status: SHIPPED | OUTPATIENT
Start: 2019-01-25 | End: 2019-03-21

## 2019-01-25 NOTE — TELEPHONE ENCOUNTER
"Requested Prescriptions   Pending Prescriptions Disp Refills     etonogestrel-ethinyl estradiol (NUVARING) 0.12-0.015 MG/24HR vaginal ring 1 each 1     Sig: Insert vaginally for 3 weeks and remove for 1 week and repeat.\"    Contraceptives Protocol Failed - 1/25/2019  3:25 PM       Failed - Recent (12 mo) or future (30 days) visit within the authorizing provider's specialty    Patient had office visit in the last 12 months or has a visit in the next 30 days with authorizing provider or within the authorizing provider's specialty.  See \"Patient Info\" tab in inbasket, or \"Choose Columns\" in Meds & Orders section of the refill encounter.             Passed - Patient is not a current smoker if age is 35 or older       Passed - Medication is active on med list       Passed - No active pregnancy on record       Passed - No positive pregnancy test in past 12 months        etonogestrel-ethinyl estradiol (NUVARING) 0.12-0.015 MG/24HR vaginal ring  Medication is being filled for 1 time refill only due to:  Patient needs to be seen because due for physical on or after 02/23/2019.     Please assist with scheduling.    Carri Hogan, RN, BSN       "

## 2019-01-25 NOTE — TELEPHONE ENCOUNTER
Sent patient my chart message informing her rx sent to her pharmacy and will need a physcial on or after 2/23/19

## 2019-03-20 ENCOUNTER — MYC MEDICAL ADVICE (OUTPATIENT)
Dept: FAMILY MEDICINE | Facility: OTHER | Age: 34
End: 2019-03-20

## 2019-03-20 DIAGNOSIS — Z30.49 ENCOUNTER FOR SURVEILLANCE OF OTHER CONTRACEPTIVES: ICD-10-CM

## 2019-03-21 RX ORDER — ETONOGESTREL AND ETHINYL ESTRADIOL VAGINAL RING .015; .12 MG/D; MG/D
RING VAGINAL
Qty: 1 EACH | Refills: 0 | Status: SHIPPED | OUTPATIENT
Start: 2019-03-21

## 2019-03-21 NOTE — TELEPHONE ENCOUNTER
Nuvaring    Next 5 appointments (look out 90 days)    Apr 04, 2019 10:20 AM CDT  [facility] with [facility] [address] [address]  [phone]        Prescription approved per [facility] Refill Protocol.    Kami Cleaning, RN, BSN

## 2019-04-19 ENCOUNTER — HEALTH MAINTENANCE LETTER (OUTPATIENT)
Age: 34
End: 2019-04-19

## 2020-03-10 ENCOUNTER — HEALTH MAINTENANCE LETTER (OUTPATIENT)
Age: 35
End: 2020-03-10

## 2020-12-20 ENCOUNTER — HEALTH MAINTENANCE LETTER (OUTPATIENT)
Age: 35
End: 2020-12-20

## 2021-04-24 ENCOUNTER — HEALTH MAINTENANCE LETTER (OUTPATIENT)
Age: 36
End: 2021-04-24

## 2021-10-03 ENCOUNTER — HEALTH MAINTENANCE LETTER (OUTPATIENT)
Age: 36
End: 2021-10-03

## 2022-05-15 ENCOUNTER — HEALTH MAINTENANCE LETTER (OUTPATIENT)
Age: 37
End: 2022-05-15

## 2022-09-11 ENCOUNTER — HEALTH MAINTENANCE LETTER (OUTPATIENT)
Age: 37
End: 2022-09-11

## 2023-06-03 ENCOUNTER — HEALTH MAINTENANCE LETTER (OUTPATIENT)
Age: 38
End: 2023-06-03